# Patient Record
Sex: FEMALE | Race: WHITE | Employment: UNEMPLOYED | URBAN - NONMETROPOLITAN AREA
[De-identification: names, ages, dates, MRNs, and addresses within clinical notes are randomized per-mention and may not be internally consistent; named-entity substitution may affect disease eponyms.]

---
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
## 2021-11-23 ENCOUNTER — APPOINTMENT (OUTPATIENT)
Dept: GENERAL RADIOLOGY | Age: 21
End: 2021-11-23
Attending: INTERNAL MEDICINE
Payer: MEDICAID

## 2021-11-23 ENCOUNTER — HOSPITAL ENCOUNTER (EMERGENCY)
Age: 21
Discharge: HOME | End: 2021-11-23
Attending: INTERNAL MEDICINE
Payer: MEDICAID

## 2021-11-23 VITALS
TEMPERATURE: 98.3 F | HEART RATE: 94 BPM | WEIGHT: 145 LBS | DIASTOLIC BLOOD PRESSURE: 71 MMHG | RESPIRATION RATE: 18 BRPM | SYSTOLIC BLOOD PRESSURE: 121 MMHG | BODY MASS INDEX: 24.16 KG/M2 | HEIGHT: 65 IN | OXYGEN SATURATION: 99 %

## 2021-11-23 DIAGNOSIS — S91.135A PUNCTURE WOUND OF FIFTH TOE OF LEFT FOOT, INITIAL ENCOUNTER: Primary | ICD-10-CM

## 2021-11-23 PROCEDURE — 90471 IMMUNIZATION ADMIN: CPT

## 2021-11-23 PROCEDURE — 74011250636 HC RX REV CODE- 250/636: Performed by: INTERNAL MEDICINE

## 2021-11-23 PROCEDURE — 90715 TDAP VACCINE 7 YRS/> IM: CPT | Performed by: INTERNAL MEDICINE

## 2021-11-23 PROCEDURE — 73660 X-RAY EXAM OF TOE(S): CPT

## 2021-11-23 PROCEDURE — 99282 EMERGENCY DEPT VISIT SF MDM: CPT

## 2021-11-23 RX ADMIN — TETANUS TOXOID, REDUCED DIPHTHERIA TOXOID AND ACELLULAR PERTUSSIS VACCINE, ADSORBED 0.5 ML: 5; 2.5; 8; 8; 2.5 SUSPENSION INTRAMUSCULAR at 09:24

## 2021-11-23 NOTE — ED NOTES
Pt discharged at 0933  Pt left ED via ambulatory. Reviewed ED discharge paperwork with patient. Pt understanding verbalized.

## 2021-11-23 NOTE — ED PROVIDER NOTES
EMERGENCY DEPARTMENT HISTORY AND PHYSICAL EXAM      Date: 11/23/2021  Patient Name: Roma Knox    History of Presenting Illness     Chief Complaint   Patient presents with    Foot Injury       History Provided By: Patient    HPI: Roma Knox, 22 y.o. female with no significant past medical history that presents to the ED with cc of nail puncture wound to the left foot; distal 5th MT area yesterday while wearing sandals. No sure of tetanus. No redness but states that it hurts when she walks on it. There are no other complaints, changes, or physical findings at this time. PCP: None    No current facility-administered medications on file prior to encounter. No current outpatient medications on file prior to encounter. Past History     Past Medical History:  History reviewed. No pertinent past medical history. Past Surgical History:  Past Surgical History:   Procedure Laterality Date    HX APPENDECTOMY         Family History:  History reviewed. No pertinent family history. Social History:  Social History     Tobacco Use    Smoking status: Current Every Day Smoker     Packs/day: 0.25    Smokeless tobacco: Never Used   Substance Use Topics    Alcohol use: Not Currently    Drug use: Never       Allergies: Allergies   Allergen Reactions    Amoxicillin Unknown (comments)         Review of Systems     Review of Systems   Skin: Positive for wound. Puncture wound left foot; distal 5th MT       Physical Exam     Physical Exam  Constitutional:       Appearance: Normal appearance. Eyes:      Pupils: Pupils are equal, round, and reactive to light. Skin:     Comments: Puncture wound left foot; distal 5th MT; no redness; TTP   Neurological:      Mental Status: She is alert. Lab and Diagnostic Study Results     Labs -   No results found for this or any previous visit (from the past 12 hour(s)).     Radiologic Studies -   @lastxrresult@  CT Results  (Last 48 hours)    None CXR Results  (Last 48 hours)    None            Medical Decision Making   - I am the first provider for this patient. - I reviewed the vital signs, available nursing notes, past medical history, past surgical history, family history and social history. - Initial assessment performed. The patients presenting problems have been discussed, and they are in agreement with the care plan formulated and outlined with them. I have encouraged them to ask questions as they arise throughout their visit. Vital Signs-Reviewed the patient's vital signs. Patient Vitals for the past 12 hrs:   Temp Pulse Resp BP SpO2   11/23/21 0843 98.3 °F (36.8 °C) 94 18 121/71 99 %       Records Reviewed: Nursing Notes    ED Course:  Healthy low risk pt; will not give antibiotics but advise close follow up if infection occurs    Procedures   Medi    Disposition   Disposition:     Discharged    DISCHARGE PLAN:  1. There are no discharge medications for this patient. 2.   Follow-up Information     Follow up With Specialties Details Why Contact [facility] EMERGENCY DEPT Emergency Medicine  As needed [place] [id number] [phone]        3. Return to ED if worse   4. There are no discharge medications for this patient. Diagnosis     Clinical Impression:   1. Puncture wound of fifth toe of left foot, initial encounter        Attestations:    Asif Flores MD    Please note that this dictation was completed with Stayful, the computer voice recognition software. Quite often unanticipated grammatical, syntax, homophones, and other interpretive errors are inadvertently transcribed by the computer software. Please disregard these errors. Please excuse any errors that have escaped final proofreading. Thank you.

## 2021-11-23 NOTE — ED TRIAGE NOTES
Pt arrives ambulatory to ED c/o puncture wound to left foot after stepping on a nail yesterday at home.  Pt states she was able to pull the nail out herself, no bleeding noted upon arrival.

## 2023-02-10 ENCOUNTER — APPOINTMENT (OUTPATIENT)
Dept: GENERAL RADIOLOGY | Age: 23
End: 2023-02-10
Attending: FAMILY MEDICINE
Payer: MEDICAID

## 2023-02-10 ENCOUNTER — HOSPITAL ENCOUNTER (EMERGENCY)
Age: 23
Discharge: HOME | End: 2023-02-10
Attending: FAMILY MEDICINE
Payer: MEDICAID

## 2023-02-10 VITALS
HEART RATE: 106 BPM | TEMPERATURE: 98.8 F | WEIGHT: 150 LBS | HEIGHT: 65 IN | RESPIRATION RATE: 18 BRPM | SYSTOLIC BLOOD PRESSURE: 112 MMHG | OXYGEN SATURATION: 98 % | BODY MASS INDEX: 24.99 KG/M2 | DIASTOLIC BLOOD PRESSURE: 60 MMHG

## 2023-02-10 DIAGNOSIS — S92.344A CLOSED NONDISPLACED FRACTURE OF FOURTH METATARSAL BONE OF RIGHT FOOT, INITIAL ENCOUNTER: ICD-10-CM

## 2023-02-10 DIAGNOSIS — S92.345A NONDISPLACED FRACTURE OF FOURTH METATARSAL BONE, LEFT FOOT, INITIAL ENCOUNTER FOR CLOSED FRACTURE: Primary | ICD-10-CM

## 2023-02-10 PROCEDURE — 99283 EMERGENCY DEPT VISIT LOW MDM: CPT

## 2023-02-10 PROCEDURE — 74011250637 HC RX REV CODE- 250/637: Performed by: FAMILY MEDICINE

## 2023-02-10 PROCEDURE — 73630 X-RAY EXAM OF FOOT: CPT

## 2023-02-10 RX ORDER — HYDROCODONE BITARTRATE AND ACETAMINOPHEN 5; 325 MG/1; MG/1
1 TABLET ORAL
Qty: 12 TABLET | Refills: 0 | Status: SHIPPED | OUTPATIENT
Start: 2023-02-10 | End: 2023-02-14

## 2023-02-10 RX ORDER — IBUPROFEN 600 MG/1
600 TABLET ORAL
Status: COMPLETED | OUTPATIENT
Start: 2023-02-10 | End: 2023-02-10

## 2023-02-10 RX ORDER — HYDROCODONE BITARTRATE AND ACETAMINOPHEN 5; 325 MG/1; MG/1
1 TABLET ORAL
Status: DISCONTINUED | OUTPATIENT
Start: 2023-02-10 | End: 2023-02-10 | Stop reason: HOSPADM

## 2023-02-10 RX ADMIN — HYDROCODONE BITARTRATE AND ACETAMINOPHEN 1 TABLET: 5; 325 TABLET ORAL at 03:28

## 2023-02-10 RX ADMIN — IBUPROFEN 600 MG: 600 TABLET, FILM COATED ORAL at 01:55

## 2023-02-10 NOTE — ED PROVIDER NOTES
Patient comes in stating just prior to her arrival here she had an issue with one of her pets. She was wearing shoes and twisted her right foot she heard a pop there is no swelling she rates her pain as a 10 out of 10. Has not taken anything for the pain. Not allergic to pain medicine other than to amoxicillin. Has not tried ice. Movement makes it worse she denies falling. Has no other complaints at this time    The history is provided by the patient. Foot Injury   This is a new problem. The current episode started less than 1 hour ago. The problem occurs rarely. The problem has not changed since onset. The pain is present in the right foot. The pain is at a severity of 10/10. Associated symptoms include limited range of motion. The symptoms are aggravated by movement. She has tried nothing for the symptoms. There has been no history of extremity trauma. No past medical history on file. Past Surgical History:   Procedure Laterality Date    HX APPENDECTOMY           No family history on file.     Social History     Socioeconomic History    Marital status:      Spouse name: Not on file    Number of children: Not on file    Years of education: Not on file    Highest education level: Not on file   Occupational History    Not on file   Tobacco Use    Smoking status: Every Day     Packs/day: 0.25     Types: Cigarettes    Smokeless tobacco: Never   Substance and Sexual Activity    Alcohol use: Not Currently    Drug use: Never    Sexual activity: Yes   Other Topics Concern    Not on file   Social History Narrative    Not on file     Social Determinants of Health     Financial Resource Strain: Not on file   Food Insecurity: Not on file   Transportation Needs: Not on file   Physical Activity: Not on file   Stress: Not on file   Social Connections: Not on file   Intimate Partner Violence: Not on file   Housing Stability: Not on file         ALLERGIES: Amoxicillin    Review of Systems   Musculoskeletal: Positive for right foot pain only   All other systems reviewed and are negative. Vitals:    02/10/23 0147   BP: 121/78   Pulse: (!) 108   Resp: 20   Temp: 98.8 °F (37.1 °C)   SpO2: 98%   Weight: 68 kg (150 lb)   Height: 5' 5\" (1.651 m)            Physical Exam  Constitutional:       Comments: Appears in pain   HENT:      Head: Normocephalic and atraumatic. Eyes:      Comments: Tears produced   Cardiovascular:      Rate and Rhythm: Regular rhythm. Tachycardia present. Pulses: Normal pulses. Musculoskeletal:         General: Signs of injury present. Comments: Patient's right midfoot has some swelling. Tender to the touch more in the midline. Less tenderness on the medial and lateral sides. Good sensation distally in her right foot. Able to move her right digits. No tenderness at the medial or lateral malleolus of the right ankle no tenderness at the right knee. Skin:     General: Skin is warm and dry. Neurological:      General: No focal deficit present. Mental Status: She is alert and oriented to person, place, and time. Psychiatric:         Mood and Affect: Mood normal.        Medical Decision Making  We will do some ibuprofen, some ice, as well as x-rays. In my differential is a fracture or bad foot sprain. Amount and/or Complexity of Data Reviewed  Radiology: ordered. Risk  Prescription drug management. Non-displaced fourth metatarsal base fracture of the right foot patient has been made aware. We will refer her to Dr. Quentin Veliz. We will give her some crutches ibuprofen we will send her home with a handful of Norco for breakthrough pain only.        Procedures

## 2023-02-10 NOTE — DISCHARGE INSTRUCTIONS
As we spoke, you have a nondisplaced fourth metatarsal fracture. My recommendations are to use 2 regular strength ibuprofen tablets every 4 hours for pain control. Use the Norco for breakthrough pain only. Walking on this can make it worse. My recommendations are to touch base with Dr. Halina Rice, the orthopedic doctor by calling his office on Monday if you do not hear from them. Return to this emergency department if you start having any concerns or questions.

## 2023-02-10 NOTE — ED TRIAGE NOTES
Pain in right foot, fell while walking dogs. Felt pop in right foot. Does have swelling in top of foot, good pedal pulse and sensation.  Cap refill brisk

## 2023-02-14 ENCOUNTER — OFFICE VISIT (OUTPATIENT)
Age: 23
End: 2023-02-14

## 2023-02-14 VITALS — BODY MASS INDEX: 24.96 KG/M2 | HEIGHT: 65 IN

## 2023-02-14 DIAGNOSIS — M79.671 RIGHT FOOT PAIN: Primary | ICD-10-CM

## 2023-02-14 DIAGNOSIS — S92.344A CLOSED NONDISPLACED FRACTURE OF FOURTH METATARSAL BONE OF RIGHT FOOT, INITIAL ENCOUNTER: ICD-10-CM

## 2023-02-14 RX ORDER — HYDROCODONE BITARTRATE AND ACETAMINOPHEN 5; 325 MG/1; MG/1
TABLET ORAL
COMMUNITY
Start: 2023-02-10

## 2023-02-14 RX ORDER — METHOCARBAMOL 500 MG/1
TABLET, FILM COATED ORAL
COMMUNITY
Start: 2022-11-08

## 2023-02-14 RX ORDER — IBUPROFEN 800 MG/1
TABLET ORAL
COMMUNITY
Start: 2022-11-08

## 2023-02-14 NOTE — PROGRESS NOTES
Name: Altagracia Lee    : 2000     Chief Complaint   Patient presents with    Foot Pain        Ht 5' 5\" (1.651 m)   BMI 24.96 kg/m²      Allergies   Allergen Reactions    Amoxicillin Swelling     Other reaction(s): Unknown (comments)        Current Outpatient Medications   Medication Sig Dispense Refill    ibuprofen (ADVIL;MOTRIN) 800 MG tablet TAKE 1 TABLET BY MOUTH THREE TIMES DAILY WITH FOOD OR MILK AS NEEDED FOR PAIN      HYDROcodone-acetaminophen (NORCO) 5-325 MG per tablet TAKE 1 TABLET BY MOUTH EVERY 8 HOURS AS NEEDED FOR PAIN FOR UP TO 5 DOSES. MAX DAILY AMOUNT 3 TABLETS      methocarbamol (ROBAXIN) 500 MG tablet TAKE 2 TABLETS BY MOUTH FOUR TIMES DAILY AS NEEDED FOR MUSCLE SPASM       No current facility-administered medications for this visit. There is no problem list on file for this patient. History reviewed. No pertinent family history. Social History     Socioeconomic History    Marital status:      Spouse name: None    Number of children: None    Years of education: None    Highest education level: None   Tobacco Use    Smoking status: Every Day     Packs/day: 0.25     Types: Cigarettes    Smokeless tobacco: Never   Substance and Sexual Activity    Alcohol use: Not Currently    Drug use: Never      Past Surgical History:   Procedure Laterality Date    APPENDECTOMY        History reviewed. No pertinent past medical history. I have reviewed and agree with [address] [place] [address] Nw and ROS and intake form in chart and the record furthermore I have reviewed prior medical record(s) regarding this patients care during this appointment. Review of Systems:   Patient is a pleasant appearing individual, appropriately dressed, well hydrated, well nourished, who is alert, appropriately oriented for age, and in no acute distress with a normal gait and normal affect who does not appear to be in any significant pain.     Physical Exam:  Right foot - grossly neurovascularly intact, good cap refill, positive tenderness to palpation in the area of the fracture(s) , positive soft tissue swelling, decreased range of motion, skin intact. Left foot- Grossly neurovascularly intact, good cap refill, full range of motion, no weakness, no swelling, no point tenderness, no skin lesions. Encounter Diagnoses   Name Primary? Right foot pain Yes    Closed nondisplaced fracture of fourth metatarsal bone of right foot, initial encounter       HPI:  The patient is here with right foot pain, throbbing, burning pain. It has been the same. Pain is 8/10. X-rays of the right foot are unremarkable except for nondisplaced base of the fourth metatarsal fracture, in good alignment. Assessment/Plan:  1. Right fourth metatarsal fracture. Plan will be for progressive weightbearing, work note to go back once she is off of crutches, ice, elevate, antiinflammatories, activities as tolerated, weightbearing started, no restrictions, and we will see the patient back as needed and go from there. As part of continued conservative pain management options the patient was advised to utilize Tylenol or OTC NSAIDS as long as it is not medically contraindicated. Return to Office: Follow-up and Dispositions    Return in about 3 weeks (around 3/7/2023) for W/ XRJUDY, STEPHANE BAINS. Scribed by Carmelina Cortes LPN as dictated by [facility] GONZALO Rea MD.  Documentation, performed by, True and Accepted Pavel Rea MD

## 2023-02-14 NOTE — LETTER
[facility]  [address], [address] [facility] [address]  Phone: [phone]  Fax: [phone]    Alicia Andrews MD        February 14, 2023     Patient: Vince Calhuon   YOB: 2000   Date of Visit: 2/14/2023       To Whom It May Concern: It is my medical opinion that Kenia Christianson has been under our care from 2/10/23 - .3/6/23. she may return to work 3/6/23 with no restrictions. If you have any questions or concerns, please don't hesitate to call.     Sincerely,        Alicia Andrews MD

## 2023-02-14 NOTE — PATIENT INSTRUCTIONS
Foot Pain: Care Instructions  Your Care Instructions     Foot injuries that cause pain and swelling are fairly common. Almost all sports or home repair projects can cause a misstep that ends up as foot pain. Normal wear and tear, especially as you get older, also can cause foot pain. Most minor foot injuries will heal on their own, and home treatment is usually all you need to do. If you have a severe injury, you may need tests and treatment. Follow-up care is a key part of your treatment and safety. Be sure to make and go to all appointments, and call your doctor if you are having problems. It's also a good idea to know your test results and keep a list of the medicines you take. How can you care for yourself at home? Take pain medicines exactly as directed. If the doctor gave you a prescription medicine for pain, take it as prescribed. If you are not taking a prescription pain medicine, ask your doctor if you can take an over-the-counter medicine. Rest and protect your foot. Take a break from any activity that may cause pain. Put ice or a cold pack on your foot for 10 to 20 minutes at a time. Put a thin cloth between the ice and your skin. Prop up the sore foot on a pillow when you ice it or anytime you sit or lie down during the next 3 days. Try to keep it above the level of your heart. This will help reduce swelling. Your doctor may recommend that you wrap your foot with an elastic bandage. Keep your foot wrapped for as long as your doctor advises. If your doctor recommends crutches, use them as directed. Wear roomy footwear. As soon as pain and swelling end, begin gentle exercises of your foot. Your doctor can tell you which exercises will help. When should you call for help? Call 911 anytime you think you may need emergency care. For example, call if:    Your foot turns pale, white, blue, or cold.    Call your doctor now or seek immediate medical care if:    You cannot move or stand on your foot.     Your foot looks twisted or out of its normal position. Your foot is not stable when you step down. You have signs of infection, such as: Increased pain, swelling, warmth, or redness. Red streaks leading from the sore area. Pus draining from a place on your foot. A fever. Your foot is numb or tingly. Watch closely for changes in your health, and be sure to contact your doctor if:    You do not get better as expected. You have bruises from an injury that last longer than 2 weeks. Where can you learn more? Go to http://www.woods.com/ and enter D999 to learn more about \"Foot Pain: Care Instructions. \"  Current as of: March 9, 2022               Content Version: 13.5  © 2006-2022 [employer]. Care instructions adapted under license by [facility] ([facility]). If you have questions about a medical condition or this instruction, always ask your healthcare professional. [place] [address] any warranty or liability for your use of this information.

## 2023-03-06 ENCOUNTER — OFFICE VISIT (OUTPATIENT)
Age: 23
End: 2023-03-06

## 2023-03-06 DIAGNOSIS — S92.344D CLOSED NONDISPLACED FRACTURE OF FOURTH METATARSAL BONE OF RIGHT FOOT WITH ROUTINE HEALING, SUBSEQUENT ENCOUNTER: Primary | ICD-10-CM

## 2023-03-06 PROCEDURE — 99024 POSTOP FOLLOW-UP VISIT: CPT | Performed by: NURSE PRACTITIONER

## 2023-03-06 NOTE — PROGRESS NOTES
Name: Miguel Enciso    : 2000    Ambulatory Bariatric Summary 2023 2/10/2023 2/10/2023    Systolic - 855 050 127   Diastolic - 60 78 71   Pulse - 106 108 94   Weight - - 150 145   Height 65 - 65 65   BMI - - 25 kg/m2 24.2 kg/m2       There is no height or weight on file to calculate BMI. Service Dept: [id number]  [address], [address] [place] [id number]  Dept: [phone]  Dept Fax: [phone]     Patient's Pharmacies:  No Pharmacies Listed     Chief Complaint   Patient presents with    Fracture    Foot Pain       HPI:  Patient follows up today for reevaluation of a right foot fracture at the base of the fourth metatarsal, nondisplaced. She is approximately 4 weeks out from the injury and has weaned off of her crutches and is wearing crocs. The foot is still sore but overall she says is improving. She supposed to go back to work tomorrow. There were no vitals taken for this visit. Allergies   Allergen Reactions    Amoxicillin Swelling     Other reaction(s): Unknown (comments)      Current Outpatient Medications   Medication Sig Dispense Refill    ibuprofen (ADVIL;MOTRIN) 800 MG tablet TAKE 1 TABLET BY MOUTH THREE TIMES DAILY WITH FOOD OR MILK AS NEEDED FOR PAIN      HYDROcodone-acetaminophen (NORCO) 5-325 MG per tablet TAKE 1 TABLET BY MOUTH EVERY 8 HOURS AS NEEDED FOR PAIN FOR UP TO 5 DOSES. MAX DAILY AMOUNT 3 TABLETS      methocarbamol (ROBAXIN) 500 MG tablet TAKE 2 TABLETS BY MOUTH FOUR TIMES DAILY AS NEEDED FOR MUSCLE SPASM       No current facility-administered medications for this visit. There is no problem list on file for this patient. History reviewed. No pertinent family history.    Social History     Socioeconomic History    Marital status:      Spouse name: None    Number of children: None    Years of education: None    Highest education level: None Tobacco Use    Smoking status: Every Day     Packs/day: 0.25     Types: Cigarettes    Smokeless tobacco: Never   Substance and Sexual Activity    Alcohol use: Not Currently    Drug use: Never      Past Surgical History:   Procedure Laterality Date    APPENDECTOMY        History reviewed. No pertinent past medical history. I have reviewed and agree with [address] and [facility] and intake form in chart and the record furthermore I have reviewed prior medical record(s) regarding this patients care during this appointment. Physical Exam:  On physical exam today there is still some mild tenderness over the area of the fracture as well as with attempted dorsiflexion of the foot. She is neurovascularly intact in the right foot. New x-rays taken today of the right foot show progressive interval healing of her right fourth metatarsal fracture at the base. Encounter Diagnosis   Name Primary? Closed nondisplaced fracture of fourth metatarsal bone of right foot with routine healing, subsequent encounter Yes        As part of continued conservative pain management options the patient was advised to utilize Tylenol or OTC NSAIDS as long as it is not medically contraindicated. Return to Office:   Today I reviewed the clinical and radiographic findings with the patient. She will continue with activity as tolerated. She will follow-up in 4 more weeks for a final kyle-ray of the right foot.          rTe Tanner, DAGOBERTO